# Patient Record
Sex: MALE | Race: WHITE | Employment: FULL TIME | URBAN - METROPOLITAN AREA
[De-identification: names, ages, dates, MRNs, and addresses within clinical notes are randomized per-mention and may not be internally consistent; named-entity substitution may affect disease eponyms.]

---

## 2020-02-24 ENCOUNTER — HOSPITAL ENCOUNTER (EMERGENCY)
Age: 50
Discharge: STILL ADMITTED OR ADMINISTRATIVE | End: 2020-02-24
Attending: EMERGENCY MEDICINE

## 2020-02-24 VITALS
RESPIRATION RATE: 14 BRPM | HEART RATE: 78 BPM | WEIGHT: 215 LBS | TEMPERATURE: 98 F | BODY MASS INDEX: 29.12 KG/M2 | DIASTOLIC BLOOD PRESSURE: 98 MMHG | SYSTOLIC BLOOD PRESSURE: 148 MMHG | HEIGHT: 72 IN | OXYGEN SATURATION: 99 %

## 2020-02-24 PROCEDURE — 75810000275 HC EMERGENCY DEPT VISIT NO LEVEL OF CARE

## 2020-02-24 NOTE — ED TRIAGE NOTES
Patient arrived in error.  Needs to go to occupational health through [facility] per [employer] [facility]

## 2021-09-21 ENCOUNTER — OFFICE VISIT (OUTPATIENT)
Dept: FAMILY MEDICINE CLINIC | Age: 51
End: 2021-09-21
Payer: COMMERCIAL

## 2021-09-21 VITALS
BODY MASS INDEX: 28.5 KG/M2 | DIASTOLIC BLOOD PRESSURE: 81 MMHG | HEART RATE: 71 BPM | TEMPERATURE: 98.6 F | HEIGHT: 72 IN | SYSTOLIC BLOOD PRESSURE: 123 MMHG | OXYGEN SATURATION: 97 % | WEIGHT: 210.4 LBS

## 2021-09-21 DIAGNOSIS — M48.02 CERVICAL SPINAL STENOSIS: ICD-10-CM

## 2021-09-21 DIAGNOSIS — M17.0 PRIMARY OSTEOARTHRITIS OF BOTH KNEES: ICD-10-CM

## 2021-09-21 DIAGNOSIS — G44.211 INTRACTABLE EPISODIC TENSION-TYPE HEADACHE: Primary | ICD-10-CM

## 2021-09-21 PROBLEM — G44.86 CERVICOGENIC HEADACHE: Status: ACTIVE | Noted: 2021-09-21

## 2021-09-21 PROCEDURE — 99203 OFFICE O/P NEW LOW 30 MIN: CPT | Performed by: INTERNAL MEDICINE

## 2021-09-21 RX ORDER — IBUPROFEN AND FAMOTIDINE 800; 26.6 MG/1; MG/1
TABLET, COATED ORAL
COMMUNITY
Start: 2021-09-15

## 2021-09-21 RX ORDER — HYDROCODONE BITARTRATE AND ACETAMINOPHEN 5; 325 MG/1; MG/1
1 TABLET ORAL
COMMUNITY

## 2021-09-21 NOTE — PROGRESS NOTES
Lucia Taylor is a 53 y.o. male (: 1970) presenting to address:    Chief Complaint   Patient presents with    New Patient       Vitals:    21 1313 21 1323   BP: (!) 144/90 123/81   Pulse: 71    Temp: 98.6 °F (37 °C)    TempSrc: Temporal    SpO2: 97%    Weight: 210 lb 6.4 oz (95.4 kg)    Height: 6' (1.829 m)        Hearing/Vision:   No exam data present    Learning Assessment:     Learning Assessment 2021   PRIMARY LEARNER Patient   BARRIERS PRIMARY LEARNER NONE   CO-LEARNER CAREGIVER No   PRIMARY LANGUAGE ENGLISH   LEARNER PREFERENCE PRIMARY DEMONSTRATION   ANSWERED BY patient   RELATIONSHIP SELF     Depression Screening:     3 most recent PHQ Screens 2021   Little interest or pleasure in doing things Not at all   Feeling down, depressed, irritable, or hopeless Not at all   Total Score PHQ 2 0     Fall Risk Assessment:   No flowsheet data found. Abuse Screening:   No flowsheet data found. Coordination of Care Questionaire:   1. Have you been to the ER, urgent care clinic since your last visit? Hospitalized since your last visit? NO    2. Have you seen or consulted any other health care providers outside of the [address] [place] [address] since your last visit? Include any pap smears or colon screening. NO    Advanced Directive:   1. Do you have an Advanced Directive? NO    2. Would you like information on Advanced Directives?  NO    Pt declined flu shot      Health Maintenance Due   Topic Date Due    Hepatitis C Screening  Never done    COVID-19 Vaccine (1) Never done    Lipid Screen  Never done    Colorectal Cancer Screening Combo  Never done    DTaP/Tdap/Td series (2 - Td or Tdap) 2020    Flu Vaccine (1) Never done

## 2021-09-21 NOTE — PROGRESS NOTES
History of Present Illness  Rob Baum is a 53 y.o. male who presents today for management of    Chief Complaint   Patient presents with    New Patient       Patient is here to establish care. Previous PCP: Dr. Cynthia Leiva    Patient reports of chronic pain on the neck, knees and left wrist/hand. Patient has been diagnosed with right knee meniscal tear few years ago after a fall. Previous therapies: physical therapy, cervical epidural injection (helpe for 2 weeks), Duexis. [place].    Patient reports of chronic frequent headaches. Pain is described as pressure, starts on the back of the neck and radiates to the whole head. Pain intensity 9-10/10. Duration few hours to 2 days. He had sleep studies done 10 years ago which was normal. MRI of the head in 2010 was unremarkable. Previous therapy: an anti-seizure, Flexeril. Current therapy: Duexis, [place].    Patient works full-time. Patient get annual employment complete physical at [facility]. Lab results and EKG from October 2020 were reviewed and will be scanned to patient's chart. Past Medical History  Past Medical History:   Diagnosis Date    Cervical spinal stenosis     Headache     Osteoarthrosis of knee     Right knee meniscal tear         Surgical History  Past Surgical History:   Procedure Laterality Date    HX CHOLECYSTECTOMY      HX FRACTURE TX      left hand     HX HERNIA REPAIR          Current Medications  Current Outpatient Medications   Medication Sig    Duexis 800-26.6 mg tab     HYDROcodone-acetaminophen (NORCO) 5-325 mg per tablet Take 1 Tablet by mouth. No current facility-administered medications for this visit.        Allergies/Drug Reactions  No Known Allergies     Family History  Family History   Problem Relation Age of Onset    Diabetes Mother     COPD Mother     Thyroid Cancer Mother     Heart Failure Father         Social History  Social History     Tobacco Use    Smoking status: Never Smoker    Smokeless tobacco: Never Used   Vaping Use    Vaping Use: Never used   Substance Use Topics    Alcohol use: Yes     Comment: almost never    Drug use: No        Health Maintenance   Topic Date Due    Hepatitis C Screening  Never done    COVID-19 Vaccine (1) Never done    Lipid Screen  Never done    Colorectal Cancer Screening Combo  Never done    DTaP/Tdap/Td series (2 - Td or Tdap) 04/17/2020    Flu Vaccine (1) Never done    Pneumococcal 0-64 years  Aged Out       There is no immunization history on file for this patient. Review of Systems  Review of Systems   Constitutional: Negative. Respiratory: Negative. Cardiovascular: Negative. Gastrointestinal: Negative. Musculoskeletal: Positive for back pain and neck pain. Neurological: Positive for headaches. Psychiatric/Behavioral: Negative. Physical Exam  Vital signs:   Vitals:    09/21/21 1313 09/21/21 1323   BP: (!) 144/90 123/81   Pulse: 71    Temp: 98.6 °F (37 °C)    TempSrc: Temporal    SpO2: 97%    Weight: 210 lb 6.4 oz (95.4 kg)    Height: 6' (1.829 m)        General: alert, oriented, not in distress  Head: scalp normal, atraumatic  Eyes: pupils are equal and reactive, full and intact EOM's  Ears: patent ear canal, intact tympanic membrane  Nose: normal turbinates, no congestion or discharge  Lips/Mouth: moist lips and buccal mucosa, non-enlarged tonsils, pink throat  Neck: supple, no JVD, no lymphadenopathy, non-palpable thyroid  Chest/Lungs: clear breath sounds, no wheezing or crackles  Heart: normal rate, regular rhythm, no murmur  Extremities: no focal deformities, no edema  Skin: no active skin lesions      Assessment/Plan:    1. Intractable episodic tension-type headache  - continue PRN pain control  - no indication for prophylactic medication at this time    2. Primary osteoarthritis of both knees  - ortho follow-up  - pain control    3. Cervical spinal stenosis  - pain control      Follow-up and Dispositions    · Return as needed.        I spent a total time of 30 minutes on the day of the visit. I have discussed the diagnosis with the patient and the intended plan as seen in the above orders. The patient has received an after-visit summary and questions were answered concerning future plans. I have discussed medication side effects and warnings with the patient as well. I have reviewed the plan of care with the patient, accepted their input and they are in agreement with the treatment goals.        Vishnu Raymond MD  September 21, 2021